# Patient Record
Sex: MALE | Race: BLACK OR AFRICAN AMERICAN | NOT HISPANIC OR LATINO
[De-identification: names, ages, dates, MRNs, and addresses within clinical notes are randomized per-mention and may not be internally consistent; named-entity substitution may affect disease eponyms.]

---

## 2020-10-12 ENCOUNTER — APPOINTMENT (OUTPATIENT)
Dept: NEPHROLOGY | Facility: CLINIC | Age: 50
End: 2020-10-12
Payer: COMMERCIAL

## 2020-10-12 DIAGNOSIS — F41.9 ANXIETY DISORDER, UNSPECIFIED: ICD-10-CM

## 2020-10-12 DIAGNOSIS — Z78.9 OTHER SPECIFIED HEALTH STATUS: ICD-10-CM

## 2020-10-12 DIAGNOSIS — Z84.1 FAMILY HISTORY OF DISORDERS OF KIDNEY AND URETER: ICD-10-CM

## 2020-10-12 PROCEDURE — 99204 OFFICE O/P NEW MOD 45 MIN: CPT | Mod: 95

## 2020-10-12 RX ORDER — ROSUVASTATIN CALCIUM 5 MG/1
5 TABLET, FILM COATED ORAL
Refills: 0 | Status: ACTIVE | COMMUNITY

## 2020-10-12 RX ORDER — METFORMIN HYDROCHLORIDE 1000 MG/1
1000 TABLET, COATED ORAL
Refills: 0 | Status: ACTIVE | COMMUNITY

## 2020-10-12 NOTE — CONSULT LETTER
[FreeTextEntry1] : Dear Dr. Flaherty,\par \par I had the pleasure of seeing Valentín Hernandez in consultation for Hematuria. My note is attached.\par \par Thank you for the opportunity to participate in the care of your patient.\par \par Please call me on my cell phone at [phone] or in the office at [phone] if you have any questions.\par \par Best regards,\par \par Abel\par \par Abel Wells MD, FACP, FASN\par [address] #[facility], [place]\par [email or web address]\par Office: [phone]\par Mobile: [phone]

## 2020-10-12 NOTE — HISTORY OF PRESENT ILLNESS
[Home] : at home, [unfilled] , at the time of the visit. [Other Location: e.g. Home (Enter Location, City,State)___] : at [unfilled] [Verbal consent obtained from patient] : the patient, [unfilled] [FreeTextEntry1] : PCP: Dr. Rey Jose and Linda Flaherty\par \par * The previous records were reviewed and summarized. * In March and July 2020 his urinalysis showed microscopic hematuria. He was initially told of microscopic hematuria 3 - 4 years ago, but a repeat test reportedly didn't show this. No gross hematuria. No pain, history of kidney stones or UTIs. No smoking. In 7/20 he had 10-20 RBCs and no protein. He had 1+heme on a 10/12 U/A but his other urinalysis since then have been negative for heme until this year. In 4/19 his microalb creatinine was 18.  No regular use of PPIs or NSAIDs. \par \par His brother had ESRD possibly due to HTN/DM.\par \par * His creatinine has remained 1.11 to 1.32  since 2012. His creatinine was most recently 1.13 in 7/20. \par \par * He believes he had Covid in March 2020 when he lost his sense of taste/smell for 6 weeks. \par \par * DM uncontrolled, HGA1c 8.9 in 7/20. No retinopathy. No neuropathy.

## 2020-10-12 NOTE — ASSESSMENT
[FreeTextEntry1] : # Microscopic hematuria, new this year, of unclear cause.\par * This may be due to DM nephropathy, which is an unusual manifestation, but he should be evaluated for other causes.\par * Recheck labs, including GN workup. (I doubt this.) \par * Check renal ultrasound. They were instructed that this referral is important for their health and that it is their responsibility to make and keep this appointment. All their questions were answered. \par * Urology referral with Dr. Hoover. Defer to Dr. Hoover re: need for further workup. They were instructed that this referral is important for their health and that it is their responsibility to make and keep this appointment. All their questions were answered. \par * The patient has been counseled that they have a a significant medical condition and regular office followup (at least every 4 months) is essential for monitoring, and that it is their responsibility to make follow up appointments. The patient also understands that they must never stop or change their medications without discussing this with me (or another physician). A counseling information sheet has been given (currently or previously, in-person or electronically). All their questions were answered. \par \par # DM uncontrolled.\par * Cont metformin. \par * Consider GLP1 agonist and endocrinology consult.

## 2020-10-13 ENCOUNTER — OUTPATIENT (OUTPATIENT)
Dept: OUTPATIENT SERVICES | Facility: HOSPITAL | Age: 50
LOS: 1 days | End: 2020-10-13

## 2020-10-13 ENCOUNTER — APPOINTMENT (OUTPATIENT)
Dept: ULTRASOUND IMAGING | Facility: CLINIC | Age: 50
End: 2020-10-13
Payer: COMMERCIAL

## 2020-10-13 PROCEDURE — 76770 US EXAM ABDO BACK WALL COMP: CPT | Mod: 26

## 2020-10-14 ENCOUNTER — TRANSCRIPTION ENCOUNTER (OUTPATIENT)
Age: 50
End: 2020-10-14

## 2020-10-16 ENCOUNTER — APPOINTMENT (OUTPATIENT)
Dept: UROLOGY | Facility: CLINIC | Age: 50
End: 2020-10-16
Payer: COMMERCIAL

## 2020-10-16 VITALS
TEMPERATURE: 97.2 F | OXYGEN SATURATION: 98 % | BODY MASS INDEX: 29.26 KG/M2 | WEIGHT: 228 LBS | SYSTOLIC BLOOD PRESSURE: 132 MMHG | HEART RATE: 74 BPM | HEIGHT: 74 IN | DIASTOLIC BLOOD PRESSURE: 76 MMHG

## 2020-10-16 PROCEDURE — 51798 US URINE CAPACITY MEASURE: CPT

## 2020-10-16 PROCEDURE — 99204 OFFICE O/P NEW MOD 45 MIN: CPT | Mod: 25

## 2020-10-16 RX ORDER — ROSUVASTATIN CALCIUM 10 MG/1
10 TABLET, FILM COATED ORAL
Refills: 0 | Status: ACTIVE | COMMUNITY

## 2020-10-16 RX ORDER — FLUTICASONE FUROATE 27.5 UG/1
27.5 SPRAY, METERED NASAL
Refills: 0 | Status: ACTIVE | COMMUNITY

## 2020-10-16 NOTE — LETTER BODY
[Dear  ___] : Dear  [unfilled], [FreeTextEntry2] : Rey Jose, \par [address] \[place] [address] [FreeTextEntry1] : I had the pleasure of seeing your patient,  WALI MILLS, a 50 year old man, in the office in consultation today. Please see the attached note for full details.\par \par Thank you very much for allowing me to participate in the care of this patient. If you have any questions please feel free to call me at any time. \par \par \par Sincerely yours,\par \par \par \par Tai Hoover MD, GRECIA\par Director, Male Fertility and Microsurgery\par  of Urology\par [facility]\par

## 2020-10-16 NOTE — HISTORY OF PRESENT ILLNESS
[FreeTextEntry1] : 50M DM referred with microheamturia. no previous smoking history. no gross hematuria. no dysuria. rare urgency. nocturia x 2. good FOS. no urinary bother. mild frequency. no history of kdiney stones. no flank pain. no nausea vomting. no fvers chills. no family histoyry prostate kdiney bladder cancer.\par renal utlrasound no abnormality\par 38 gram prostate\par no addtioanl complaints

## 2020-10-16 NOTE — ASSESSMENT
[FreeTextEntry1] : microhematuria\par UA UCX\par cystoscopy\par PVR 82cc\par \par BPH\par no bother\par will monitor\par \par

## 2020-10-16 NOTE — PHYSICAL EXAM
[General Appearance - Well Developed] : well developed [General Appearance - Well Nourished] : well nourished [Normal Appearance] : normal appearance [Well Groomed] : well groomed [Heart Rate And Rhythm] : Heart rate and rhythm were normal [] : no respiratory distress [Abdomen Soft] : soft [Abdomen Tenderness] : non-tender [Abdomen Hernia] : no hernia was discovered [Costovertebral Angle Tenderness] : no ~M costovertebral angle tenderness [Urethral Meatus] : meatus normal [Penis Abnormality] : normal uncircumcised penis [Urinary Bladder Findings] : the bladder was normal on palpation [Scrotum] : the scrotum was normal [Epididymis] : the epididymides were normal [Testes Tenderness] : no tenderness of the testes [Testes Mass (___cm)] : there were no testicular masses [Prostate Size ___ gm] : prostate size [unfilled] gm [No Prostate Nodules] : no prostate nodules [Normal Station and Gait] : the gait and station were normal for the patient's age [Skin Color & Pigmentation] : normal skin color and pigmentation [No Focal Deficits] : no focal deficits [Oriented To Time, Place, And Person] : oriented to person, place, and time [No Palpable Adenopathy] : no palpable adenopathy

## 2020-10-20 ENCOUNTER — LABORATORY RESULT (OUTPATIENT)
Age: 50
End: 2020-10-20

## 2020-10-23 LAB
ALBUMIN MFR SERPL ELPH: 60.7 %
ALBUMIN SERPL ELPH-MCNC: 4.8 G/DL
ALBUMIN SERPL-MCNC: 4.5 G/DL
ALBUMIN/GLOB SERPL: 1.6 RATIO
ALP BLD-CCNC: 93 U/L
ALPHA1 GLOB MFR SERPL ELPH: 3 %
ALPHA1 GLOB SERPL ELPH-MCNC: 0.2 G/DL
ALPHA2 GLOB MFR SERPL ELPH: 8.9 %
ALPHA2 GLOB SERPL ELPH-MCNC: 0.7 G/DL
ALT SERPL-CCNC: 20 U/L
ANION GAP SERPL CALC-SCNC: 15 MMOL/L
APPEARANCE: CLEAR
AST SERPL-CCNC: 19 U/L
B-GLOBULIN MFR SERPL ELPH: 13.5 %
B-GLOBULIN SERPL ELPH-MCNC: 1 G/DL
BACTERIA: NEGATIVE
BASOPHILS # BLD AUTO: 0.02 K/UL
BASOPHILS NFR BLD AUTO: 0.3 %
BILIRUB SERPL-MCNC: 0.7 MG/DL
BILIRUBIN URINE: NEGATIVE
BLOOD URINE: NEGATIVE
BUN SERPL-MCNC: 10 MG/DL
C3 SERPL-MCNC: 147 MG/DL
C4 SERPL-MCNC: 27 MG/DL
CALCIUM SERPL-MCNC: 9.8 MG/DL
CHLORIDE SERPL-SCNC: 105 MMOL/L
CO2 SERPL-SCNC: 23 MMOL/L
COLOR: YELLOW
CREAT SERPL-MCNC: 1.09 MG/DL
CREAT SPEC-SCNC: 122 MG/DL
CREAT SPEC-SCNC: 122 MG/DL
CREAT/PROT UR: 0.1 RATIO
DEPRECATED KAPPA LC FREE/LAMBDA SER: 0.84 RATIO
DSDNA AB SER-ACNC: <12 IU/ML
EOSINOPHIL # BLD AUTO: 0.09 K/UL
EOSINOPHIL NFR BLD AUTO: 1.3 %
ESTIMATED AVERAGE GLUCOSE: 246 MG/DL
GAMMA GLOB FLD ELPH-MCNC: 1 G/DL
GAMMA GLOB MFR SERPL ELPH: 13.9 %
GLUCOSE QUALITATIVE U: ABNORMAL
GLUCOSE SERPL-MCNC: 224 MG/DL
HBA1C MFR BLD HPLC: 10.2 %
HBV SURFACE AG SER QL: NONREACTIVE
HCT VFR BLD CALC: 50.3 %
HCV AB SER QL: NONREACTIVE
HCV S/CO RATIO: 0.07 S/CO
HGB BLD-MCNC: 17.1 G/DL
HIV1+2 AB SPEC QL IA.RAPID: NONREACTIVE
HYALINE CASTS: 0 /LPF
IGA SER QL IEP: 474 MG/DL
IGG SER QL IEP: 1036 MG/DL
IGM SER QL IEP: 56 MG/DL
IMM GRANULOCYTES NFR BLD AUTO: 0.3 %
INTERPRETATION SERPL IEP-IMP: NORMAL
KAPPA LC CSF-MCNC: 1.26 MG/DL
KAPPA LC SERPL-MCNC: 1.06 MG/DL
KETONES URINE: NEGATIVE
LEUKOCYTE ESTERASE URINE: NEGATIVE
LYMPHOCYTES # BLD AUTO: 2.46 K/UL
LYMPHOCYTES NFR BLD AUTO: 34.5 %
M PROTEIN SPEC IFE-MCNC: NORMAL
MAN DIFF?: NORMAL
MCHC RBC-ENTMCNC: 30.2 PG
MCHC RBC-ENTMCNC: 34 GM/DL
MCV RBC AUTO: 88.9 FL
MICROALBUMIN 24H UR DL<=1MG/L-MCNC: 2.3 MG/DL
MICROALBUMIN/CREAT 24H UR-RTO: 19 MG/G
MICROSCOPIC-UA: NORMAL
MONOCYTES # BLD AUTO: 0.61 K/UL
MONOCYTES NFR BLD AUTO: 8.6 %
MPO AB + PR3 PNL SER: NORMAL
NEUTROPHILS # BLD AUTO: 3.93 K/UL
NEUTROPHILS NFR BLD AUTO: 55 %
NITRITE URINE: NEGATIVE
PH URINE: 6
PLATELET # BLD AUTO: 198 K/UL
POTASSIUM SERPL-SCNC: 4.5 MMOL/L
PROT SERPL-MCNC: 7.4 G/DL
PROT UR-MCNC: 9 MG/DL
PROTEIN URINE: NORMAL
RBC # BLD: 5.66 M/UL
RBC # FLD: 12.6 %
RED BLOOD CELLS URINE: 1 /HPF
RHEUMATOID FACT SER QL: <10 IU/ML
SODIUM SERPL-SCNC: 143 MMOL/L
SPECIFIC GRAVITY URINE: 1.03
SQUAMOUS EPITHELIAL CELLS: 0 /HPF
UROBILINOGEN URINE: NORMAL
WBC # FLD AUTO: 7.13 K/UL
WHITE BLOOD CELLS URINE: 1 /HPF

## 2020-10-24 LAB
ALBUPE: 27.4 %
ALPHA1UPE: 31.9 %
ALPHA2UPE: 16.9 %
BETAUPE: 14 %
GAMMAUPE: 9.8 %
IGA 24H UR QL IFE: NORMAL
KAPPA LC 24H UR QL: NORMAL
PROT PATTERN 24H UR ELPH-IMP: NORMAL
PROT UR-MCNC: 8 MG/DL
PROT UR-MCNC: 8 MG/DL

## 2020-10-30 ENCOUNTER — OUTPATIENT (OUTPATIENT)
Dept: OUTPATIENT SERVICES | Facility: HOSPITAL | Age: 50
LOS: 1 days | End: 2020-10-30
Payer: COMMERCIAL

## 2020-10-30 ENCOUNTER — APPOINTMENT (OUTPATIENT)
Dept: UROLOGY | Facility: CLINIC | Age: 50
End: 2020-10-30
Payer: COMMERCIAL

## 2020-10-30 VITALS — SYSTOLIC BLOOD PRESSURE: 121 MMHG | HEART RATE: 70 BPM | DIASTOLIC BLOOD PRESSURE: 78 MMHG | TEMPERATURE: 95.7 F

## 2020-10-30 PROCEDURE — 71046 X-RAY EXAM CHEST 2 VIEWS: CPT

## 2020-10-30 PROCEDURE — 71046 X-RAY EXAM CHEST 2 VIEWS: CPT | Mod: 26

## 2020-10-30 PROCEDURE — 52000 CYSTOURETHROSCOPY: CPT

## 2020-10-30 PROCEDURE — 99072 ADDL SUPL MATRL&STAF TM PHE: CPT

## 2020-10-30 PROCEDURE — 99213 OFFICE O/P EST LOW 20 MIN: CPT | Mod: 25

## 2020-11-02 LAB
ANION GAP SERPL CALC-SCNC: 13 MMOL/L
APPEARANCE: CLEAR
APTT BLD: 33.5 SEC
BACTERIA UR CULT: NORMAL
BACTERIA: NEGATIVE
BASOPHILS # BLD AUTO: 0.01 K/UL
BASOPHILS NFR BLD AUTO: 0.1 %
BILIRUBIN URINE: NEGATIVE
BLOOD URINE: NEGATIVE
BUN SERPL-MCNC: 12 MG/DL
CALCIUM SERPL-MCNC: 10.5 MG/DL
CHLORIDE SERPL-SCNC: 103 MMOL/L
CO2 SERPL-SCNC: 26 MMOL/L
COLOR: YELLOW
CREAT SERPL-MCNC: 1.13 MG/DL
EOSINOPHIL # BLD AUTO: 0.12 K/UL
EOSINOPHIL NFR BLD AUTO: 1.3 %
GLUCOSE QUALITATIVE U: ABNORMAL
GLUCOSE SERPL-MCNC: 142 MG/DL
HCT VFR BLD CALC: 51.6 %
HGB BLD-MCNC: 17 G/DL
HYALINE CASTS: 0 /LPF
IMM GRANULOCYTES NFR BLD AUTO: 0.2 %
INR PPP: 1.03 RATIO
KETONES URINE: NEGATIVE
LEUKOCYTE ESTERASE URINE: NEGATIVE
LYMPHOCYTES # BLD AUTO: 3.01 K/UL
LYMPHOCYTES NFR BLD AUTO: 32.8 %
MAN DIFF?: NORMAL
MCHC RBC-ENTMCNC: 29.5 PG
MCHC RBC-ENTMCNC: 32.9 GM/DL
MCV RBC AUTO: 89.6 FL
MICROSCOPIC-UA: NORMAL
MONOCYTES # BLD AUTO: 0.81 K/UL
MONOCYTES NFR BLD AUTO: 8.8 %
NEUTROPHILS # BLD AUTO: 5.21 K/UL
NEUTROPHILS NFR BLD AUTO: 56.8 %
NITRITE URINE: NEGATIVE
PH URINE: 5.5
PLATELET # BLD AUTO: 194 K/UL
POTASSIUM SERPL-SCNC: 5.2 MMOL/L
PROTEIN URINE: NORMAL
PT BLD: 12.1 SEC
RBC # BLD: 5.76 M/UL
RBC # FLD: 12.8 %
RED BLOOD CELLS URINE: 1 /HPF
SODIUM SERPL-SCNC: 142 MMOL/L
SPECIFIC GRAVITY URINE: 1.03
SQUAMOUS EPITHELIAL CELLS: 1 /HPF
UROBILINOGEN URINE: NORMAL
WBC # FLD AUTO: 9.18 K/UL
WHITE BLOOD CELLS URINE: 2 /HPF

## 2020-11-02 NOTE — ASSESSMENT
[FreeTextEntry1] : bladder tumor\par reviewed high likelihood bladder cancer\par risks TURBT hematuria dysuria fevers bladder perforation need for mari reviewed\par will schedule\par needs CT urogram

## 2020-11-02 NOTE — HISTORY OF PRESENT ILLNESS
[FreeTextEntry1] : see attached cystoscopy\par +flat bladder tumor left lateral wall\par here to discuss

## 2020-11-13 ENCOUNTER — OUTPATIENT (OUTPATIENT)
Dept: OUTPATIENT SERVICES | Facility: HOSPITAL | Age: 50
LOS: 1 days | End: 2020-11-13

## 2020-11-13 ENCOUNTER — APPOINTMENT (OUTPATIENT)
Dept: CT IMAGING | Facility: CLINIC | Age: 50
End: 2020-11-13
Payer: COMMERCIAL

## 2020-11-13 PROCEDURE — 74178 CT ABD&PLV WO CNTR FLWD CNTR: CPT | Mod: 26

## 2020-11-19 DIAGNOSIS — Z01.818 ENCOUNTER FOR OTHER PREPROCEDURAL EXAMINATION: ICD-10-CM

## 2020-11-22 ENCOUNTER — TRANSCRIPTION ENCOUNTER (OUTPATIENT)
Age: 50
End: 2020-11-22

## 2020-11-23 ENCOUNTER — APPOINTMENT (OUTPATIENT)
Dept: UROLOGY | Facility: AMBULATORY SURGERY CENTER | Age: 50
End: 2020-11-23

## 2020-11-23 ENCOUNTER — RESULT REVIEW (OUTPATIENT)
Age: 50
End: 2020-11-23

## 2020-11-23 ENCOUNTER — OUTPATIENT (OUTPATIENT)
Dept: OUTPATIENT SERVICES | Facility: HOSPITAL | Age: 50
LOS: 1 days | Discharge: HOME | End: 2020-11-23
Payer: COMMERCIAL

## 2020-11-23 LAB
GLUCOSE BLDC GLUCOMTR-MCNC: 99 MG/DL — SIGNIFICANT CHANGE UP (ref 70–99)
SARS-COV-2 N GENE NPH QL NAA+PROBE: NOT DETECTED

## 2020-11-23 PROCEDURE — 88305 TISSUE EXAM BY PATHOLOGIST: CPT | Mod: 26

## 2020-11-23 PROCEDURE — 52235 CYSTOSCOPY AND TREATMENT: CPT

## 2020-11-25 LAB — SURGICAL PATHOLOGY STUDY: SIGNIFICANT CHANGE UP

## 2020-12-01 ENCOUNTER — APPOINTMENT (OUTPATIENT)
Dept: UROLOGY | Facility: CLINIC | Age: 50
End: 2020-12-01
Payer: COMMERCIAL

## 2020-12-01 VITALS — TEMPERATURE: 95 F | DIASTOLIC BLOOD PRESSURE: 84 MMHG | SYSTOLIC BLOOD PRESSURE: 130 MMHG | HEART RATE: 76 BPM

## 2020-12-01 PROCEDURE — 99214 OFFICE O/P EST MOD 30 MIN: CPT | Mod: 57

## 2020-12-01 PROCEDURE — 99072 ADDL SUPL MATRL&STAF TM PHE: CPT

## 2020-12-01 NOTE — ASSESSMENT
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

## 2020-12-01 NOTE — HISTORY OF PRESENT ILLNESS
[FreeTextEntry1] : returns for follow up\par s/p TURBT\par HGT1 urotheilial CA + CIS\par feels well\par no hematuria\par no dysuria\par

## 2020-12-02 LAB
ANION GAP SERPL CALC-SCNC: 14 MMOL/L
APPEARANCE: CLEAR
APTT BLD: 34.7 SEC
BACTERIA: NEGATIVE
BASOPHILS # BLD AUTO: 0.02 K/UL
BASOPHILS NFR BLD AUTO: 0.3 %
BILIRUBIN URINE: NEGATIVE
BLOOD URINE: ABNORMAL
BUN SERPL-MCNC: 12 MG/DL
CALCIUM SERPL-MCNC: 10.2 MG/DL
CHLORIDE SERPL-SCNC: 103 MMOL/L
CO2 SERPL-SCNC: 23 MMOL/L
COLOR: YELLOW
CREAT SERPL-MCNC: 1.11 MG/DL
EOSINOPHIL # BLD AUTO: 0.1 K/UL
EOSINOPHIL NFR BLD AUTO: 1.3 %
GLUCOSE QUALITATIVE U: NEGATIVE
GLUCOSE SERPL-MCNC: 139 MG/DL
HCT VFR BLD CALC: 53.5 %
HGB BLD-MCNC: 17.1 G/DL
HYALINE CASTS: 1 /LPF
IMM GRANULOCYTES NFR BLD AUTO: 0.4 %
INR PPP: 0.93 RATIO
KETONES URINE: NEGATIVE
LEUKOCYTE ESTERASE URINE: ABNORMAL
LYMPHOCYTES # BLD AUTO: 2.22 K/UL
LYMPHOCYTES NFR BLD AUTO: 29 %
MAN DIFF?: NORMAL
MCHC RBC-ENTMCNC: 28.7 PG
MCHC RBC-ENTMCNC: 32 GM/DL
MCV RBC AUTO: 89.9 FL
MICROSCOPIC-UA: NORMAL
MONOCYTES # BLD AUTO: 0.7 K/UL
MONOCYTES NFR BLD AUTO: 9.2 %
NEUTROPHILS # BLD AUTO: 4.58 K/UL
NEUTROPHILS NFR BLD AUTO: 59.8 %
NITRITE URINE: NEGATIVE
PH URINE: 5.5
PLATELET # BLD AUTO: 196 K/UL
POTASSIUM SERPL-SCNC: 5.2 MMOL/L
PROTEIN URINE: ABNORMAL
PT BLD: 11 SEC
RBC # BLD: 5.95 M/UL
RBC # FLD: 12.7 %
RED BLOOD CELLS URINE: 12 /HPF
SODIUM SERPL-SCNC: 141 MMOL/L
SPECIFIC GRAVITY URINE: 1.03
SQUAMOUS EPITHELIAL CELLS: 0 /HPF
UROBILINOGEN URINE: NORMAL
WBC # FLD AUTO: 7.65 K/UL
WHITE BLOOD CELLS URINE: 20 /HPF

## 2020-12-04 LAB — BACTERIA UR CULT: NORMAL

## 2020-12-21 ENCOUNTER — OUTPATIENT (OUTPATIENT)
Dept: OUTPATIENT SERVICES | Facility: HOSPITAL | Age: 50
LOS: 1 days | Discharge: HOME | End: 2020-12-21
Payer: COMMERCIAL

## 2020-12-21 ENCOUNTER — RESULT REVIEW (OUTPATIENT)
Age: 50
End: 2020-12-21

## 2020-12-21 ENCOUNTER — APPOINTMENT (OUTPATIENT)
Dept: UROLOGY | Facility: AMBULATORY SURGERY CENTER | Age: 50
End: 2020-12-21

## 2020-12-21 LAB
BACTERIA UR CULT: NORMAL
GLUCOSE BLDC GLUCOMTR-MCNC: 114 MG/DL — HIGH (ref 70–99)
SARS-COV-2 N GENE NPH QL NAA+PROBE: NOT DETECTED

## 2020-12-21 PROCEDURE — 88305 TISSUE EXAM BY PATHOLOGIST: CPT | Mod: 26

## 2020-12-21 PROCEDURE — 52235 CYSTOSCOPY AND TREATMENT: CPT

## 2020-12-21 RX ORDER — ACETAMINOPHEN WITH CODEINE 300MG-30MG
1 TABLET ORAL
Qty: 15 | Refills: 0
Start: 2020-12-21

## 2020-12-21 RX ORDER — DOCUSATE SODIUM 100 MG
1 CAPSULE ORAL
Qty: 42 | Refills: 0
Start: 2020-12-21 | End: 2021-01-10

## 2020-12-23 LAB — SURGICAL PATHOLOGY STUDY: SIGNIFICANT CHANGE UP

## 2020-12-29 ENCOUNTER — APPOINTMENT (OUTPATIENT)
Dept: UROLOGY | Facility: CLINIC | Age: 50
End: 2020-12-29
Payer: COMMERCIAL

## 2020-12-29 VITALS — SYSTOLIC BLOOD PRESSURE: 133 MMHG | DIASTOLIC BLOOD PRESSURE: 90 MMHG | TEMPERATURE: 94.5 F | HEART RATE: 88 BPM

## 2020-12-29 PROCEDURE — 99072 ADDL SUPL MATRL&STAF TM PHE: CPT

## 2020-12-29 PROCEDURE — 99213 OFFICE O/P EST LOW 20 MIN: CPT

## 2020-12-29 NOTE — ASSESSMENT
[FreeTextEntry1] : HGT1 + CIS\par reviewed natural history , rtes of recurrence and progression\par role of BCG reviewed\par risks bleeding infection BCGosis fevers reviewed\par would like to proceed\par will schedule

## 2020-12-29 NOTE — HISTORY OF PRESENT ILLNESS
[FreeTextEntry1] : returns for follow up after repeat TUR\par HgT1 +CIS\par repeat TUR no residual tumor\par feels well

## 2021-01-26 ENCOUNTER — APPOINTMENT (OUTPATIENT)
Dept: UROLOGY | Facility: CLINIC | Age: 51
End: 2021-01-26
Payer: COMMERCIAL

## 2021-01-26 VITALS — TEMPERATURE: 97.4 F | HEART RATE: 75 BPM | SYSTOLIC BLOOD PRESSURE: 134 MMHG | DIASTOLIC BLOOD PRESSURE: 81 MMHG

## 2021-01-26 PROCEDURE — 99072 ADDL SUPL MATRL&STAF TM PHE: CPT

## 2021-01-26 PROCEDURE — 51720 TREATMENT OF BLADDER LESION: CPT

## 2021-01-26 PROCEDURE — A4216: CPT | Mod: NC

## 2021-01-26 RX ORDER — BACILLUS CALMETTE-GUERIN 50 MG/50ML
50 POWDER, FOR SUSPENSION INTRAVESICAL
Qty: 0 | Refills: 0 | Status: COMPLETED | OUTPATIENT
Start: 2021-01-26

## 2021-01-26 RX ADMIN — BACILLUS CALMETTE-GUERIN 0 MG: 50 POWDER, FOR SUSPENSION INTRAVESICAL at 00:00

## 2021-02-05 ENCOUNTER — APPOINTMENT (OUTPATIENT)
Dept: UROLOGY | Facility: CLINIC | Age: 51
End: 2021-02-05
Payer: COMMERCIAL

## 2021-02-05 ENCOUNTER — MED ADMIN CHARGE (OUTPATIENT)
Age: 51
End: 2021-02-05

## 2021-02-05 VITALS
SYSTOLIC BLOOD PRESSURE: 118 MMHG | HEART RATE: 79 BPM | TEMPERATURE: 96.9 F | DIASTOLIC BLOOD PRESSURE: 76 MMHG | WEIGHT: 233 LBS | BODY MASS INDEX: 29.9 KG/M2 | HEIGHT: 74 IN

## 2021-02-05 PROCEDURE — 51720 TREATMENT OF BLADDER LESION: CPT

## 2021-02-05 PROCEDURE — 99072 ADDL SUPL MATRL&STAF TM PHE: CPT

## 2021-02-05 RX ORDER — BACILLUS CALMETTE-GUERIN 50 MG/50ML
50 POWDER, FOR SUSPENSION INTRAVESICAL
Qty: 0 | Refills: 0 | Status: COMPLETED | OUTPATIENT
Start: 2021-02-05

## 2021-02-05 RX ADMIN — BACILLUS CALMETTE-GUERIN 0 MG: 50 POWDER, FOR SUSPENSION INTRAVESICAL at 00:00

## 2021-02-08 ENCOUNTER — APPOINTMENT (OUTPATIENT)
Dept: NEPHROLOGY | Facility: CLINIC | Age: 51
End: 2021-02-08
Payer: COMMERCIAL

## 2021-02-08 PROCEDURE — 99214 OFFICE O/P EST MOD 30 MIN: CPT | Mod: 95

## 2021-02-08 NOTE — HISTORY OF PRESENT ILLNESS
[Home] : at home, [unfilled] , at the time of the visit. [Other Location: e.g. Home (Enter Location, City,State)___] : at [unfilled] [Verbal consent obtained from patient] : the patient, [unfilled] [FreeTextEntry1] : PCP: Dr. Rey Jose and Linda Flaherty\par \par * Dx with bladder cancer by Dr. Hoover. Now removed. Treated with BCG. * Stable obesity. * No albuminuria. DM previously uncontrolled, HGA1c 10.2. \par \par Previous history (12Oct20): * The previous records were reviewed and summarized. * In March and July 2020 his urinalysis showed microscopic hematuria. He was initially told of microscopic hematuria 3 - 4 years ago, but a repeat test reportedly didn't show this. No gross hematuria. No pain, history of kidney stones or UTIs. No smoking. In 7/20 he had 10-20 RBCs and no protein. He had 1+heme on a 10/12 U/A but his other urinalysis since then have been negative for heme until this year. In 4/19 his microalb creatinine was 18.  No regular use of PPIs or NSAIDs. \par \par His brother had ESRD possibly due to HTN/DM.\par \par * His creatinine has remained 1.11 to 1.32  since 2012. His creatinine was most recently 1.13 in 7/20. \par \par * He believes he had Covid in March 2020 when he lost his sense of taste/smell for 6 weeks. \par \par * DM uncontrolled, HGA1c 8.9 in 7/20. No retinopathy. No neuropathy.

## 2021-02-08 NOTE — ASSESSMENT
[FreeTextEntry1] : # Intermittent hematuria.\par * This is consistent with bladder cancer.\par * Undergoing BCG treatment.\par * Follow up closely with Dr. Hoover.\par * [employer] CKD genetic panel ordered. Informed consent given ([email or web address] * Recheck urine protein next visit. \par * The patient has been counseled that they have a a significant medical condition and regular office followup (at least every 4 months) is essential for monitoring, and that it is their responsibility to make follow up appointments. The patient also understands that they must never stop or change their medications without discussing this with me (or another physician). A counseling information sheet has been given (currently or previously, in-person or electronically). All their questions were answered. \par \par # Obesity.\par * Weight loss.\par \par # Type 2 DM uncontrolled. \par * Increase metformin to 1000 bid.\par * Endocrinology evaluation recommended.\par

## 2021-02-12 ENCOUNTER — MED ADMIN CHARGE (OUTPATIENT)
Age: 51
End: 2021-02-12

## 2021-02-12 ENCOUNTER — APPOINTMENT (OUTPATIENT)
Dept: UROLOGY | Facility: CLINIC | Age: 51
End: 2021-02-12
Payer: COMMERCIAL

## 2021-02-12 VITALS — TEMPERATURE: 95 F | SYSTOLIC BLOOD PRESSURE: 123 MMHG | HEART RATE: 73 BPM | DIASTOLIC BLOOD PRESSURE: 76 MMHG

## 2021-02-12 PROCEDURE — 99072 ADDL SUPL MATRL&STAF TM PHE: CPT

## 2021-02-12 PROCEDURE — 51720 TREATMENT OF BLADDER LESION: CPT

## 2021-02-12 RX ORDER — BACILLUS CALMETTE-GUERIN 50 MG/50ML
50 POWDER, FOR SUSPENSION INTRAVESICAL
Qty: 0 | Refills: 0 | Status: COMPLETED | OUTPATIENT
Start: 2021-02-12

## 2021-02-12 RX ADMIN — BACILLUS CALMETTE-GUERIN 0 MG: 50 POWDER, FOR SUSPENSION INTRAVESICAL at 00:00

## 2021-02-19 ENCOUNTER — APPOINTMENT (OUTPATIENT)
Dept: UROLOGY | Facility: CLINIC | Age: 51
End: 2021-02-19
Payer: COMMERCIAL

## 2021-02-19 VITALS — TEMPERATURE: 96.4 F | SYSTOLIC BLOOD PRESSURE: 115 MMHG | DIASTOLIC BLOOD PRESSURE: 79 MMHG | HEART RATE: 76 BPM

## 2021-02-19 PROCEDURE — 51720 TREATMENT OF BLADDER LESION: CPT

## 2021-02-19 PROCEDURE — 99072 ADDL SUPL MATRL&STAF TM PHE: CPT

## 2021-02-19 RX ORDER — BACILLUS CALMETTE-GUERIN 50 MG/50ML
50 POWDER, FOR SUSPENSION INTRAVESICAL
Qty: 0 | Refills: 0 | Status: COMPLETED | OUTPATIENT
Start: 2021-02-19

## 2021-02-19 RX ADMIN — BACILLUS CALMETTE-GUERIN 0 MG: 50 POWDER, FOR SUSPENSION INTRAVESICAL at 00:00

## 2021-02-26 ENCOUNTER — APPOINTMENT (OUTPATIENT)
Dept: UROLOGY | Facility: CLINIC | Age: 51
End: 2021-02-26
Payer: COMMERCIAL

## 2021-02-26 VITALS — HEART RATE: 90 BPM | TEMPERATURE: 94 F | DIASTOLIC BLOOD PRESSURE: 82 MMHG | SYSTOLIC BLOOD PRESSURE: 124 MMHG

## 2021-02-26 PROCEDURE — 51720 TREATMENT OF BLADDER LESION: CPT

## 2021-02-26 PROCEDURE — 99072 ADDL SUPL MATRL&STAF TM PHE: CPT

## 2021-02-26 RX ORDER — BACILLUS CALMETTE-GUERIN 50 MG/50ML
50 POWDER, FOR SUSPENSION INTRAVESICAL
Qty: 0 | Refills: 0 | Status: COMPLETED | OUTPATIENT
Start: 2021-02-26

## 2021-02-26 RX ADMIN — BACILLUS CALMETTE-GUERIN 0 MG: 50 POWDER, FOR SUSPENSION INTRAVESICAL at 00:00

## 2021-03-05 ENCOUNTER — APPOINTMENT (OUTPATIENT)
Dept: UROLOGY | Facility: CLINIC | Age: 51
End: 2021-03-05
Payer: COMMERCIAL

## 2021-03-05 VITALS — SYSTOLIC BLOOD PRESSURE: 125 MMHG | HEART RATE: 76 BPM | TEMPERATURE: 95 F | DIASTOLIC BLOOD PRESSURE: 79 MMHG

## 2021-03-05 PROCEDURE — 51720 TREATMENT OF BLADDER LESION: CPT

## 2021-03-05 PROCEDURE — 99072 ADDL SUPL MATRL&STAF TM PHE: CPT

## 2021-03-05 RX ORDER — BACILLUS CALMETTE-GUERIN 50 MG/50ML
50 POWDER, FOR SUSPENSION INTRAVESICAL
Qty: 0 | Refills: 0 | Status: COMPLETED | OUTPATIENT
Start: 2021-03-05

## 2021-03-05 RX ADMIN — BACILLUS CALMETTE-GUERIN 0 MG: 50 POWDER, FOR SUSPENSION INTRAVESICAL at 00:00

## 2021-04-08 ENCOUNTER — APPOINTMENT (OUTPATIENT)
Dept: UROLOGY | Facility: CLINIC | Age: 51
End: 2021-04-08
Payer: COMMERCIAL

## 2021-04-08 VITALS — HEART RATE: 86 BPM | SYSTOLIC BLOOD PRESSURE: 126 MMHG | TEMPERATURE: 96.3 F | DIASTOLIC BLOOD PRESSURE: 77 MMHG

## 2021-04-08 PROCEDURE — 99072 ADDL SUPL MATRL&STAF TM PHE: CPT

## 2021-04-08 PROCEDURE — 52000 CYSTOURETHROSCOPY: CPT

## 2021-04-20 LAB — URINE CYTOLOGY: NORMAL

## 2021-06-07 ENCOUNTER — APPOINTMENT (OUTPATIENT)
Dept: NEPHROLOGY | Facility: CLINIC | Age: 51
End: 2021-06-07
Payer: COMMERCIAL

## 2021-06-07 DIAGNOSIS — R31.29 OTHER MICROSCOPIC HEMATURIA: ICD-10-CM

## 2021-06-07 DIAGNOSIS — E11.9 TYPE 2 DIABETES MELLITUS W/OUT COMPLICATIONS: ICD-10-CM

## 2021-06-07 DIAGNOSIS — E66.9 OBESITY, UNSPECIFIED: ICD-10-CM

## 2021-06-07 PROCEDURE — 99214 OFFICE O/P EST MOD 30 MIN: CPT | Mod: 95

## 2021-06-07 NOTE — HISTORY OF PRESENT ILLNESS
[Home] : at home, [unfilled] , at the time of the visit. [Other Location: e.g. Home (Enter Location, City,State)___] : at [unfilled] [Verbal consent obtained from patient] : the patient, [unfilled] [FreeTextEntry1] : PCP: Dr. Rey Jose and Linda Flaherty\par \par * CKD stable, creatinine 1.1. No albuminuria. * 12 RBC last visit. Followin gup with Dr. Hoover for bladder cancer. * DM uncontrolled, HGA1c 8. * Stable obesity. \par \par Previous history (08Feb21): * Dx with bladder cancer by Dr. Hoover. Now removed. Treated with BCG. * Stable obesity. * No albuminuria. DM previously uncontrolled, HGA1c 10.2. \par \par Previous history (12Oct20): * The previous records were reviewed and summarized. * In March and July 2020 his urinalysis showed microscopic hematuria. He was initially told of microscopic hematuria 3 - 4 years ago, but a repeat test reportedly didn't show this. No gross hematuria. No pain, history of kidney stones or UTIs. No smoking. In 7/20 he had 10-20 RBCs and no protein. He had 1+heme on a 10/12 U/A but his other urinalysis since then have been negative for heme until this year. In 4/19 his microalb creatinine was 18.  No regular use of PPIs or NSAIDs. \par \par His brother had ESRD possibly due to HTN/DM.\par \par * His creatinine has remained 1.11 to 1.32  since 2012. His creatinine was most recently 1.13 in 7/20. \par \par * He believes he had Covid in March 2020 when he lost his sense of taste/smell for 6 weeks. \par \par * DM uncontrolled, HGA1c 8.9 in 7/20. No retinopathy. No neuropathy.

## 2021-06-07 NOTE — ASSESSMENT
[FreeTextEntry1] : # Intermittent hematuria.\par * This is consistent with bladder cancer.\par * Undergoing BCG treatment.\par * Follow up closely with Dr. Hoover.\par * Recheck urine protein next visit. \par * The patient has been counseled that they have a a significant medical condition and regular office followup (at least every 6 months) is essential for monitoring, and that it is their responsibility to make follow up appointments. The patient also understands that they must never stop or change their medications without discussing this with me (or another physician). A counseling information sheet has been given (currently or previously, in-person or electronically). All their questions were answered. \par \par # Obesity.\par * Weight loss.\par \par # Type 2 DM uncontrolled. \par * Cont metformin to 1000 bid and glimperide. \par * Endocrinology evaluation recommended.

## 2021-07-09 ENCOUNTER — APPOINTMENT (OUTPATIENT)
Dept: UROLOGY | Facility: CLINIC | Age: 51
End: 2021-07-09
Payer: COMMERCIAL

## 2021-07-09 VITALS — DIASTOLIC BLOOD PRESSURE: 76 MMHG | SYSTOLIC BLOOD PRESSURE: 127 MMHG | TEMPERATURE: 97.4 F | HEART RATE: 79 BPM

## 2021-07-09 PROCEDURE — 52000 CYSTOURETHROSCOPY: CPT

## 2021-07-14 LAB — URINE CYTOLOGY: NORMAL

## 2021-10-12 ENCOUNTER — LABORATORY RESULT (OUTPATIENT)
Age: 51
End: 2021-10-12

## 2021-10-12 ENCOUNTER — APPOINTMENT (OUTPATIENT)
Dept: UROLOGY | Facility: CLINIC | Age: 51
End: 2021-10-12
Payer: COMMERCIAL

## 2021-10-12 VITALS — HEART RATE: 83 BPM | DIASTOLIC BLOOD PRESSURE: 88 MMHG | TEMPERATURE: 97.4 F | SYSTOLIC BLOOD PRESSURE: 154 MMHG

## 2021-10-12 PROCEDURE — 52000 CYSTOURETHROSCOPY: CPT

## 2021-10-18 LAB
APPEARANCE: CLEAR
BACTERIA UR CULT: NORMAL
BACTERIA: NEGATIVE
BILIRUBIN URINE: NEGATIVE
BLOOD URINE: NEGATIVE
CALCIUM OXALATE CRYSTALS: ABNORMAL
COLOR: NORMAL
GLUCOSE QUALITATIVE U: NEGATIVE
HYALINE CASTS: 0 /LPF
KETONES URINE: NEGATIVE
LEUKOCYTE ESTERASE URINE: NEGATIVE
MICROSCOPIC-UA: NORMAL
NITRITE URINE: NEGATIVE
PH URINE: 5.5
PROTEIN URINE: NORMAL
RED BLOOD CELLS URINE: 1 /HPF
SPECIFIC GRAVITY URINE: 1.03
SQUAMOUS EPITHELIAL CELLS: 0 /HPF
UROBILINOGEN URINE: NORMAL
WHITE BLOOD CELLS URINE: 0 /HPF

## 2022-01-01 NOTE — REASON FOR VISIT
[FreeTextEntry1] : Microscopic hematuria Peng Moore  (RN)  2022 01:35:41 Peng Moore  (RN)  2022 01:34:36

## 2022-01-11 ENCOUNTER — LABORATORY RESULT (OUTPATIENT)
Age: 52
End: 2022-01-11

## 2022-01-11 ENCOUNTER — APPOINTMENT (OUTPATIENT)
Dept: UROLOGY | Facility: CLINIC | Age: 52
End: 2022-01-11
Payer: COMMERCIAL

## 2022-01-11 VITALS
TEMPERATURE: 97.3 F | WEIGHT: 233 LBS | HEIGHT: 74 IN | HEART RATE: 89 BPM | DIASTOLIC BLOOD PRESSURE: 79 MMHG | BODY MASS INDEX: 29.9 KG/M2 | SYSTOLIC BLOOD PRESSURE: 147 MMHG

## 2022-01-11 PROCEDURE — 52000 CYSTOURETHROSCOPY: CPT

## 2022-01-27 ENCOUNTER — NON-APPOINTMENT (OUTPATIENT)
Age: 52
End: 2022-01-27

## 2022-04-01 ENCOUNTER — APPOINTMENT (OUTPATIENT)
Dept: UROLOGY | Facility: CLINIC | Age: 52
End: 2022-04-01
Payer: COMMERCIAL

## 2022-04-01 VITALS — DIASTOLIC BLOOD PRESSURE: 78 MMHG | HEART RATE: 87 BPM | TEMPERATURE: 97.5 F | SYSTOLIC BLOOD PRESSURE: 132 MMHG

## 2022-04-01 PROCEDURE — 52000 CYSTOURETHROSCOPY: CPT

## 2022-04-04 LAB — BACTERIA UR CULT: NORMAL

## 2022-04-07 LAB — URINE CYTOLOGY: NORMAL

## 2022-07-01 ENCOUNTER — APPOINTMENT (OUTPATIENT)
Dept: UROLOGY | Facility: CLINIC | Age: 52
End: 2022-07-01

## 2022-07-01 VITALS — HEART RATE: 73 BPM | DIASTOLIC BLOOD PRESSURE: 82 MMHG | SYSTOLIC BLOOD PRESSURE: 137 MMHG | TEMPERATURE: 97.8 F

## 2022-07-01 PROCEDURE — 52000 CYSTOURETHROSCOPY: CPT

## 2022-10-07 ENCOUNTER — APPOINTMENT (OUTPATIENT)
Dept: UROLOGY | Facility: CLINIC | Age: 52
End: 2022-10-07

## 2022-10-07 VITALS — TEMPERATURE: 98 F | DIASTOLIC BLOOD PRESSURE: 88 MMHG | SYSTOLIC BLOOD PRESSURE: 137 MMHG | HEART RATE: 81 BPM

## 2022-10-07 PROCEDURE — 52000 CYSTOURETHROSCOPY: CPT

## 2022-10-12 LAB
APPEARANCE: CLEAR
BACTERIA UR CULT: NORMAL
BACTERIA: NEGATIVE
BILIRUBIN URINE: NEGATIVE
BLOOD URINE: NEGATIVE
COLOR: NORMAL
GLUCOSE QUALITATIVE U: NEGATIVE
HYALINE CASTS: 0 /LPF
KETONES URINE: NEGATIVE
LEUKOCYTE ESTERASE URINE: NEGATIVE
MICROSCOPIC-UA: NORMAL
NITRITE URINE: NEGATIVE
PH URINE: 5.5
PROTEIN URINE: NORMAL
RED BLOOD CELLS URINE: 0 /HPF
SPECIFIC GRAVITY URINE: 1.03
SQUAMOUS EPITHELIAL CELLS: 0 /HPF
UROBILINOGEN URINE: NORMAL
WHITE BLOOD CELLS URINE: 0 /HPF

## 2022-10-19 LAB — URINE CYTOLOGY: NORMAL

## 2023-01-13 ENCOUNTER — APPOINTMENT (OUTPATIENT)
Dept: UROLOGY | Facility: CLINIC | Age: 53
End: 2023-01-13
Payer: COMMERCIAL

## 2023-01-13 VITALS — DIASTOLIC BLOOD PRESSURE: 84 MMHG | TEMPERATURE: 97.7 F | SYSTOLIC BLOOD PRESSURE: 133 MMHG | HEART RATE: 76 BPM

## 2023-01-13 PROCEDURE — 52000 CYSTOURETHROSCOPY: CPT

## 2023-07-14 ENCOUNTER — APPOINTMENT (OUTPATIENT)
Dept: UROLOGY | Facility: CLINIC | Age: 53
End: 2023-07-14
Payer: COMMERCIAL

## 2023-07-14 ENCOUNTER — LABORATORY RESULT (OUTPATIENT)
Age: 53
End: 2023-07-14

## 2023-07-14 VITALS — DIASTOLIC BLOOD PRESSURE: 76 MMHG | SYSTOLIC BLOOD PRESSURE: 151 MMHG | HEART RATE: 87 BPM | TEMPERATURE: 97.3 F

## 2023-07-14 PROCEDURE — 52000 CYSTOURETHROSCOPY: CPT

## 2023-07-17 LAB
APPEARANCE: CLEAR
BACTERIA UR CULT: NORMAL
BACTERIA: NEGATIVE /HPF
BILIRUBIN URINE: NEGATIVE
BLOOD URINE: NEGATIVE
CAST: 0 /LPF
COLOR: YELLOW
EPITHELIAL CELLS: 0 /HPF
GLUCOSE QUALITATIVE U: NEGATIVE MG/DL
KETONES URINE: NEGATIVE MG/DL
LEUKOCYTE ESTERASE URINE: NEGATIVE
MICROSCOPIC-UA: NORMAL
NITRITE URINE: NEGATIVE
PH URINE: 5.5
PROTEIN URINE: NORMAL MG/DL
RED BLOOD CELLS URINE: 0 /HPF
SPECIFIC GRAVITY URINE: 1.02
UROBILINOGEN URINE: 0.2 MG/DL
WHITE BLOOD CELLS URINE: 0 /HPF

## 2024-01-05 ENCOUNTER — APPOINTMENT (OUTPATIENT)
Dept: UROLOGY | Facility: CLINIC | Age: 54
End: 2024-01-05
Payer: COMMERCIAL

## 2024-01-05 VITALS — DIASTOLIC BLOOD PRESSURE: 76 MMHG | HEART RATE: 84 BPM | SYSTOLIC BLOOD PRESSURE: 131 MMHG | TEMPERATURE: 97.8 F

## 2024-01-05 DIAGNOSIS — C67.9 MALIGNANT NEOPLASM OF BLADDER, UNSPECIFIED: ICD-10-CM

## 2024-01-05 PROCEDURE — 52000 CYSTOURETHROSCOPY: CPT

## 2024-01-08 LAB
APPEARANCE: ABNORMAL
BACTERIA UR CULT: NORMAL
BACTERIA: NEGATIVE /HPF
BILIRUBIN URINE: NEGATIVE
BLOOD URINE: NEGATIVE
CAST: 0 /LPF
COLOR: YELLOW
EPITHELIAL CELLS: 0 /HPF
GLUCOSE QUALITATIVE U: NEGATIVE MG/DL
KETONES URINE: ABNORMAL MG/DL
LEUKOCYTE ESTERASE URINE: NEGATIVE
MICROSCOPIC-UA: NORMAL
NITRITE URINE: NEGATIVE
PH URINE: 5.5
PROTEIN URINE: NORMAL MG/DL
RED BLOOD CELLS URINE: 0 /HPF
SPECIFIC GRAVITY URINE: >1.03
UROBILINOGEN URINE: 0.2 MG/DL
WHITE BLOOD CELLS URINE: 0 /HPF

## 2024-01-10 LAB — URINE CYTOLOGY: NORMAL

## 2024-07-12 ENCOUNTER — NON-APPOINTMENT (OUTPATIENT)
Age: 54
End: 2024-07-12

## 2024-07-12 ENCOUNTER — APPOINTMENT (OUTPATIENT)
Dept: UROLOGY | Facility: CLINIC | Age: 54
End: 2024-07-12
Payer: COMMERCIAL

## 2024-07-12 VITALS
TEMPERATURE: 96.5 F | BODY MASS INDEX: 29.9 KG/M2 | HEIGHT: 74 IN | WEIGHT: 233 LBS | SYSTOLIC BLOOD PRESSURE: 127 MMHG | HEART RATE: 80 BPM | DIASTOLIC BLOOD PRESSURE: 71 MMHG

## 2024-07-12 DIAGNOSIS — C67.9 MALIGNANT NEOPLASM OF BLADDER, UNSPECIFIED: ICD-10-CM

## 2024-07-12 PROCEDURE — 52000 CYSTOURETHROSCOPY: CPT

## 2024-12-18 ENCOUNTER — APPOINTMENT (OUTPATIENT)
Dept: ENDOCRINOLOGY | Facility: CLINIC | Age: 54
End: 2024-12-18

## 2024-12-18 VITALS
SYSTOLIC BLOOD PRESSURE: 145 MMHG | WEIGHT: 233 LBS | BODY MASS INDEX: 29.92 KG/M2 | HEART RATE: 85 BPM | DIASTOLIC BLOOD PRESSURE: 85 MMHG

## 2024-12-18 DIAGNOSIS — I10 ESSENTIAL (PRIMARY) HYPERTENSION: ICD-10-CM

## 2024-12-18 DIAGNOSIS — E78.5 HYPERLIPIDEMIA, UNSPECIFIED: ICD-10-CM

## 2024-12-18 DIAGNOSIS — E11.9 TYPE 2 DIABETES MELLITUS W/OUT COMPLICATIONS: ICD-10-CM

## 2024-12-18 DIAGNOSIS — E66.9 OBESITY, UNSPECIFIED: ICD-10-CM

## 2024-12-18 DIAGNOSIS — E04.2 NONTOXIC MULTINODULAR GOITER: ICD-10-CM

## 2024-12-18 LAB
GLUCOSE BLDC GLUCOMTR-MCNC: 146
HBA1C MFR BLD HPLC: 7.8

## 2024-12-18 PROCEDURE — 82962 GLUCOSE BLOOD TEST: CPT

## 2024-12-18 PROCEDURE — 36415 COLL VENOUS BLD VENIPUNCTURE: CPT

## 2024-12-18 PROCEDURE — 83036 HEMOGLOBIN GLYCOSYLATED A1C: CPT | Mod: QW

## 2024-12-18 PROCEDURE — G2211 COMPLEX E/M VISIT ADD ON: CPT | Mod: NC

## 2024-12-18 PROCEDURE — 99205 OFFICE O/P NEW HI 60 MIN: CPT

## 2024-12-18 RX ORDER — SEMAGLUTIDE 1.34 MG/ML
4 INJECTION, SOLUTION SUBCUTANEOUS
Qty: 12 | Refills: 2 | Status: ACTIVE | COMMUNITY
Start: 2024-12-18 | End: 1900-01-01

## 2024-12-27 LAB
ALBUMIN SERPL ELPH-MCNC: 4.9 G/DL
ALDOSTERONE SERUM: 15.5 NG/DL
ALP BLD-CCNC: 106 U/L
ALT SERPL-CCNC: 21 U/L
ANION GAP SERPL CALC-SCNC: 16 MMOL/L
AST SERPL-CCNC: 16 U/L
BILIRUB SERPL-MCNC: 0.8 MG/DL
BUN SERPL-MCNC: 12 MG/DL
CALCIUM SERPL-MCNC: 10 MG/DL
CHLORIDE SERPL-SCNC: 101 MMOL/L
CHOLEST SERPL-MCNC: 174 MG/DL
CO2 SERPL-SCNC: 22 MMOL/L
CREAT SERPL-MCNC: 1.1 MG/DL
CREAT SPEC-SCNC: 162 MG/DL
EGFR: 80 ML/MIN/1.73M2
ESTIMATED AVERAGE GLUCOSE: 192 MG/DL
FOLATE SERPL-MCNC: 8.7 NG/ML
GLUCOSE SERPL-MCNC: 121 MG/DL
HBA1C MFR BLD HPLC: 8.3 %
HDLC SERPL-MCNC: 48 MG/DL
LDLC SERPL CALC-MCNC: 99 MG/DL
MICROALBUMIN 24H UR DL<=1MG/L-MCNC: 2.9 MG/DL
MICROALBUMIN/CREAT 24H UR-RTO: 18 MG/G
NONHDLC SERPL-MCNC: 125 MG/DL
POTASSIUM SERPL-SCNC: 4.4 MMOL/L
PROT SERPL-MCNC: 7.6 G/DL
RENIN PLASMA: 8.3 PG/ML
SODIUM SERPL-SCNC: 140 MMOL/L
T4 FREE SERPL-MCNC: 0.9 NG/DL
TRIGL SERPL-MCNC: 149 MG/DL
TSH SERPL-ACNC: 2.75 UIU/ML
VIT B12 SERPL-MCNC: 515 PG/ML

## 2025-01-10 ENCOUNTER — APPOINTMENT (OUTPATIENT)
Dept: UROLOGY | Facility: CLINIC | Age: 55
End: 2025-01-10
Payer: COMMERCIAL

## 2025-01-10 VITALS
DIASTOLIC BLOOD PRESSURE: 73 MMHG | BODY MASS INDEX: 29.9 KG/M2 | TEMPERATURE: 98.1 F | HEIGHT: 74 IN | HEART RATE: 111 BPM | SYSTOLIC BLOOD PRESSURE: 135 MMHG | WEIGHT: 233 LBS

## 2025-01-10 DIAGNOSIS — C67.9 MALIGNANT NEOPLASM OF BLADDER, UNSPECIFIED: ICD-10-CM

## 2025-01-10 PROCEDURE — 52000 CYSTOURETHROSCOPY: CPT

## 2025-01-13 LAB
APPEARANCE: CLEAR
BACTERIA UR CULT: NORMAL
BACTERIA: NEGATIVE /HPF
BILIRUBIN URINE: NEGATIVE
BLOOD URINE: NEGATIVE
CAST: 1 /LPF
COLOR: NORMAL
EPITHELIAL CELLS: 0 /HPF
GLUCOSE QUALITATIVE U: 250 MG/DL
KETONES URINE: NEGATIVE MG/DL
LEUKOCYTE ESTERASE URINE: NEGATIVE
MICROSCOPIC-UA: NORMAL
NITRITE URINE: NEGATIVE
PH URINE: 5.5
PROTEIN URINE: NORMAL MG/DL
RED BLOOD CELLS URINE: 0 /HPF
SPECIFIC GRAVITY URINE: 1.03
UROBILINOGEN URINE: 0.2 MG/DL
WHITE BLOOD CELLS URINE: 0 /HPF

## 2025-01-16 LAB — URINE CYTOLOGY: NORMAL

## 2025-03-20 ENCOUNTER — APPOINTMENT (OUTPATIENT)
Dept: ENDOCRINOLOGY | Facility: CLINIC | Age: 55
End: 2025-03-20
Payer: COMMERCIAL

## 2025-03-20 VITALS
SYSTOLIC BLOOD PRESSURE: 115 MMHG | DIASTOLIC BLOOD PRESSURE: 65 MMHG | BODY MASS INDEX: 28.76 KG/M2 | WEIGHT: 224 LBS | HEART RATE: 76 BPM

## 2025-03-20 DIAGNOSIS — E11.9 TYPE 2 DIABETES MELLITUS W/OUT COMPLICATIONS: ICD-10-CM

## 2025-03-20 DIAGNOSIS — I10 ESSENTIAL (PRIMARY) HYPERTENSION: ICD-10-CM

## 2025-03-20 DIAGNOSIS — E04.2 NONTOXIC MULTINODULAR GOITER: ICD-10-CM

## 2025-03-20 DIAGNOSIS — E66.9 OBESITY, UNSPECIFIED: ICD-10-CM

## 2025-03-20 DIAGNOSIS — E78.5 HYPERLIPIDEMIA, UNSPECIFIED: ICD-10-CM

## 2025-03-20 LAB
GLUCOSE BLDC GLUCOMTR-MCNC: 114
HBA1C MFR BLD HPLC: 7

## 2025-03-20 PROCEDURE — 82962 GLUCOSE BLOOD TEST: CPT

## 2025-03-20 PROCEDURE — 99215 OFFICE O/P EST HI 40 MIN: CPT

## 2025-03-20 PROCEDURE — 83036 HEMOGLOBIN GLYCOSYLATED A1C: CPT | Mod: QW

## 2025-04-11 ENCOUNTER — APPOINTMENT (OUTPATIENT)
Dept: ENDOCRINOLOGY | Facility: CLINIC | Age: 55
End: 2025-04-11

## 2025-07-10 ENCOUNTER — APPOINTMENT (OUTPATIENT)
Dept: ENDOCRINOLOGY | Facility: CLINIC | Age: 55
End: 2025-07-10

## 2025-07-10 VITALS
SYSTOLIC BLOOD PRESSURE: 112 MMHG | DIASTOLIC BLOOD PRESSURE: 72 MMHG | HEIGHT: 74 IN | WEIGHT: 222 LBS | HEART RATE: 82 BPM | BODY MASS INDEX: 28.49 KG/M2

## 2025-07-10 LAB
GLUCOSE BLDC GLUCOMTR-MCNC: 185
HBA1C MFR BLD HPLC: 7.3

## 2025-07-10 PROCEDURE — 83036 HEMOGLOBIN GLYCOSYLATED A1C: CPT | Mod: QW

## 2025-07-10 PROCEDURE — 82962 GLUCOSE BLOOD TEST: CPT

## 2025-07-10 PROCEDURE — 99214 OFFICE O/P EST MOD 30 MIN: CPT

## 2025-07-10 RX ORDER — METFORMIN HYDROCHLORIDE 1000 MG/1
1000 TABLET, COATED ORAL TWICE DAILY
Qty: 180 | Refills: 3 | Status: ACTIVE | COMMUNITY
Start: 2025-07-10 | End: 1900-01-01

## 2025-07-10 RX ORDER — INSULIN GLARGINE-YFGN 100 [IU]/ML
100 INJECTION, SOLUTION SUBCUTANEOUS
Qty: 2 | Refills: 3 | Status: ACTIVE | COMMUNITY
Start: 2025-07-10 | End: 1900-01-01

## 2025-07-12 ENCOUNTER — APPOINTMENT (OUTPATIENT)
Dept: ULTRASOUND IMAGING | Facility: CLINIC | Age: 55
End: 2025-07-12
Payer: COMMERCIAL

## 2025-07-12 ENCOUNTER — OUTPATIENT (OUTPATIENT)
Dept: OUTPATIENT SERVICES | Facility: HOSPITAL | Age: 55
LOS: 1 days | End: 2025-07-12

## 2025-07-12 PROCEDURE — 76536 US EXAM OF HEAD AND NECK: CPT | Mod: 26

## 2025-08-22 ENCOUNTER — APPOINTMENT (OUTPATIENT)
Dept: ENDOCRINOLOGY | Facility: CLINIC | Age: 55
End: 2025-08-22